# Patient Record
Sex: MALE | Race: BLACK OR AFRICAN AMERICAN | NOT HISPANIC OR LATINO | Employment: FULL TIME | URBAN - METROPOLITAN AREA
[De-identification: names, ages, dates, MRNs, and addresses within clinical notes are randomized per-mention and may not be internally consistent; named-entity substitution may affect disease eponyms.]

---

## 2023-08-10 ENCOUNTER — OFFICE VISIT (OUTPATIENT)
Dept: FAMILY MEDICINE | Facility: CLINIC | Age: 18
End: 2023-08-10

## 2023-08-10 VITALS
OXYGEN SATURATION: 100 % | WEIGHT: 173 LBS | DIASTOLIC BLOOD PRESSURE: 70 MMHG | SYSTOLIC BLOOD PRESSURE: 110 MMHG | TEMPERATURE: 100.8 F | HEART RATE: 94 BPM

## 2023-08-10 DIAGNOSIS — R50.9 FEVER, UNSPECIFIED FEVER CAUSE: ICD-10-CM

## 2023-08-10 DIAGNOSIS — J06.9 VIRAL URI: Primary | ICD-10-CM

## 2023-08-10 LAB — DEPRECATED S PYO AG THROAT QL EIA: NEGATIVE

## 2023-08-10 PROCEDURE — 87651 STREP A DNA AMP PROBE: CPT | Performed by: STUDENT IN AN ORGANIZED HEALTH CARE EDUCATION/TRAINING PROGRAM

## 2023-08-10 PROCEDURE — 99203 OFFICE O/P NEW LOW 30 MIN: CPT | Performed by: STUDENT IN AN ORGANIZED HEALTH CARE EDUCATION/TRAINING PROGRAM

## 2023-08-10 PROCEDURE — 87635 SARS-COV-2 COVID-19 AMP PRB: CPT | Performed by: STUDENT IN AN ORGANIZED HEALTH CARE EDUCATION/TRAINING PROGRAM

## 2023-08-10 RX ORDER — IBUPROFEN 200 MG
800 TABLET ORAL ONCE
Status: COMPLETED | OUTPATIENT
Start: 2023-08-10 | End: 2023-08-10

## 2023-08-10 RX ADMIN — Medication 800 MG: at 18:33

## 2023-08-10 NOTE — PROGRESS NOTES
ASSESSMENT & PLAN:   Diagnoses and all orders for this visit:  Viral URI  Fever, unspecified fever cause  -     Streptococcus A Rapid Screen w/Reflex to PCR - Clinic Collect  -     ibuprofen (ADVIL/MOTRIN) tablet 800 mg  -     Symptomatic COVID-19 Virus (Coronavirus) by PCR Nose  -     Group A Streptococcus PCR Throat Swab    URI symptoms x 1 day. Febrile at 101.3F upon presentation. After ibuprofen, fever, decreased to 100.8F and headache improved. Rapid strep negative, PCR pending. COVID test pending. Likely viral etiology. Discussed supportive treatment with OTC analgesics, Mucinex, increase fluids, rest. Follow-up if not improving as expected.    No follow-ups on file.    Patient Instructions   This is likely a viral illness. Viral illnesses will improve with time and symptoms may last 7 to 10 days.    Rapid strep test negative. Culture is pending. Covid test pending.  We will call you only with positive results.  For your sore throat, you may try salt water gargles, tea with honey, throat lozenges/spray, using a humidifier.  May try Mucinex to help with congestion/cough.  Take tylenol and/or ibuprofen as needed for pain/fever.  Stay well-hydrated, get plenty of rest, and wash your hands often.   Follow-up with your PCP in 7-10 days if symptoms persist, sooner if symptoms worsen.        Acetaminophen (tylenol)  325-650 mg every 4-6 hours as needed OR 1000 mg every 6 hours as needed. Maximum dose: 4000 mg/day  Ibuprofen (advil, motrin)  200-400 mg every 4-6 hours as needed -800 mg every 6-8 hours as needed. Maximum dose: 3200 mg/day     At the end of the encounter, I discussed results, diagnosis, medications. Discussed red flags for immediate return to clinic/ER, as well as indications for follow up if no improvement. Patient and/or caregiver understood and agreed to plan. Patient was stable for discharge.    ------------------------------------------------------------------------  SUBJECTIVE  Patient  presents with:  Urgent Care: Onset of headache, vertigo, fever/chills started yesterday. No known head injury. Pt has tried tylenol. Pt states no has no ST, ear pain.    HPI  Tommy Bhakta is a(n) 17 year old male presenting to urgent care with uncle for URI symptoms that began yesterday. Endorses sore throat, cough, headache, body aches, fever. Took Tylenol this morning which did not help. No congestion, rhinorrhea, nausea, vomiting, diarrhea, abdominal pain. No known sick contacts.    Review of Systems    No current outpatient medications on file.     Problem List:  There are no relevant problems documented for this patient.    No Known Allergies      OBJECTIVE  Vitals:    08/10/23 1720 08/10/23 1927   BP: 110/70    BP Location: Right arm    Patient Position: Sitting    Pulse: 94    Temp: (!) 101.3  F (38.5  C) (!) 100.8  F (38.2  C)   TempSrc: Oral    SpO2: 100%    Weight: 78.5 kg (173 lb)      Physical Exam   GENERAL: healthy, alert, no acute distress.   HEAD: normocephalic, atraumatic.  EYE: PERRL. EOMs intact. No scleral injection bilaterally.   EAR: external ear normal. Bilateral ear canals normal and nonpainful. Bilateral TM intact, pearly, translucent without bulging.  NOSE: external nose atraumatic without lesions.  OROPHARYNX: moist mucous membranes. Posterior oropharynx erythematous. No exudate. Uvula midline. Patent airway.  LUNGS: no increased work of breathing. Clear lung sounds bilaterally. No wheezing, rhonchi, or rales.   CV: regular rate and rhythm. No clicks, murmurs, or rubs.    Results for orders placed or performed in visit on 08/10/23   Streptococcus A Rapid Screen w/Reflex to PCR - Clinic Collect     Status: Normal    Specimen: Throat; Swab   Result Value Ref Range    Group A Strep antigen Negative Negative

## 2023-08-10 NOTE — PATIENT INSTRUCTIONS
This is likely a viral illness. Viral illnesses will improve with time and symptoms may last 7 to 10 days.    Rapid strep test negative. Culture is pending. Covid test pending.  We will call you only with positive results.  For your sore throat, you may try salt water gargles, tea with honey, throat lozenges/spray, using a humidifier.  May try Mucinex to help with congestion/cough.  Take tylenol and/or ibuprofen as needed for pain/fever.  Stay well-hydrated, get plenty of rest, and wash your hands often.   Follow-up with your PCP in 7-10 days if symptoms persist, sooner if symptoms worsen.        Acetaminophen (tylenol)  325-650 mg every 4-6 hours as needed OR 1000 mg every 6 hours as needed. Maximum dose: 4000 mg/day  Ibuprofen (advil, motrin)  200-400 mg every 4-6 hours as needed -800 mg every 6-8 hours as needed. Maximum dose: 3200 mg/day

## 2023-08-11 LAB
GROUP A STREP BY PCR: NOT DETECTED
SARS-COV-2 RNA RESP QL NAA+PROBE: NEGATIVE

## 2023-11-26 ENCOUNTER — APPOINTMENT (OUTPATIENT)
Dept: RADIOLOGY | Facility: HOSPITAL | Age: 18
End: 2023-11-26
Attending: STUDENT IN AN ORGANIZED HEALTH CARE EDUCATION/TRAINING PROGRAM
Payer: OTHER MISCELLANEOUS

## 2023-11-26 ENCOUNTER — HOSPITAL ENCOUNTER (EMERGENCY)
Facility: HOSPITAL | Age: 18
Discharge: HOME | End: 2023-11-27
Admitting: STUDENT IN AN ORGANIZED HEALTH CARE EDUCATION/TRAINING PROGRAM
Payer: OTHER MISCELLANEOUS

## 2023-11-26 VITALS
RESPIRATION RATE: 17 BRPM | WEIGHT: 175 LBS | HEIGHT: 70 IN | OXYGEN SATURATION: 98 % | HEART RATE: 74 BPM | DIASTOLIC BLOOD PRESSURE: 91 MMHG | TEMPERATURE: 97.3 F | BODY MASS INDEX: 25.05 KG/M2 | SYSTOLIC BLOOD PRESSURE: 135 MMHG

## 2023-11-26 DIAGNOSIS — M25.531 RIGHT WRIST PAIN: ICD-10-CM

## 2023-11-26 PROCEDURE — 73110 X-RAY EXAM OF WRIST: CPT | Mod: RT

## 2023-11-26 PROCEDURE — 99283 EMERGENCY DEPT VISIT LOW MDM: CPT

## 2023-11-26 PROCEDURE — 73130 X-RAY EXAM OF HAND: CPT | Mod: RT

## 2023-11-26 PROCEDURE — 250N000013 HC RX MED GY IP 250 OP 250 PS 637: Performed by: STUDENT IN AN ORGANIZED HEALTH CARE EDUCATION/TRAINING PROGRAM

## 2023-11-26 RX ORDER — IBUPROFEN 600 MG/1
600 TABLET, FILM COATED ORAL ONCE
Status: COMPLETED | OUTPATIENT
Start: 2023-11-26 | End: 2023-11-26

## 2023-11-26 RX ADMIN — IBUPROFEN 600 MG: 600 TABLET, FILM COATED ORAL at 20:32

## 2023-11-26 ASSESSMENT — ACTIVITIES OF DAILY LIVING (ADL)
ADLS_ACUITY_SCORE: 33
ADLS_ACUITY_SCORE: 33

## 2023-11-26 NOTE — Clinical Note
Tommy Bhakta was seen and treated in our emergency department on 11/26/2023.  He may return to work on 11/27/2023.  Please limit lifting over 15 lbs. Do not do motions that cause your wrist pain. Follow up with [facility] in 3 days if symptoms have not improved.     If you have any questions or concerns, please don't hesitate to call.      Mallory Powell PA-C

## 2023-11-27 NOTE — ED PROVIDER NOTES
Emergency Department Encounter   NAME: Tommy Bhakta ; AGE: 18 year old male ; YOB: 2005 ; MRN: [id number] ; PCP: No Ref-Primary, Physician   ED PROVIDER: Mallory Powell PA-C    Evaluation Date & Time:   No admission date for patient encounter.    CHIEF COMPLAINT:  Wrist Pain (Right )        Impression and Plan   FINAL IMPRESSION:    ICD-10-CM    1. Right wrist pain  M25.531 Wrist/Arm Supplies Order Wrist Brace; Right; with thumb spica          MDM:  Tommy Bhkata is a healthy 18-year-old male with no significant PMH presenting to the emergency department with his uncle for evaluation of right wrist pain that occurred while at work at [employer] earlier today. He states he was lifting some tires while at work and one caused his wrist to ulnarly deviate. He denies any numbness or tingling. He rates his current pain at 8/10, worse with bending the wrist. He denies any previous injury to the wrist or surgery.    Vitals reviewed and unremarkable. On exam he is resting comfortably. He is tender to palpation over the distal radius and scaphoid with very mild edema present. No overlying erythema or ecchymosis. Differential diagnosis includes but not limited to contusion, muscle strain, fracture. XR right wrist is normal, no evidence of fracture or malalignment. Right wrist and digit ROM full. Right sided  strength 4/5 in all directions due to pain when compared to the left. He is neurovascularly intact. He was given ibuprofen for pain and placed in a thumb spica wrist brace. I educated him on RICE and recommended ibuprofen for pain and swelling. He can wear the brace as needed to prevent further irritation. I also recommended follow up with [facility] if his wrist remains bothersome after the initial pain and swelling from the injury subsides given this was a work related injury. He and his uncle are understanding and agreeable to this plan.      Medical Decision  Making    History:  Supplemental history from: Documented in chart, if applicable  External Record(s) reviewed: Documented in chart, if applicable.    Work Up:  Chart documentation includes differential considered and any EKGs or imaging independently interpreted by provider, where specified.  In additional to work up documented, I considered the following work up: Documented in chart, if applicable.    External consultation:  Discussion of management with another provider: Documented in chart, if applicable    Complicating factors:  Care impacted by chronic illness: N/A  Care affected by social determinants of health: N/A    Disposition considerations: Discharge. No recommendations on prescription strength medication(s). See documentation for any additional details.      ED COURSE:  8:20 PM I met and introduced myself to the patient. I gathered initial history and performed my physical exam. We discussed results, discharge, follow up, and reasons to return to the ED.     At the conclusion of the encounter I discussed the results of all the tests and the disposition. The questions were answered. The patient or family acknowledged understanding and was agreeable with the care plan.        MEDICATIONS GIVEN IN THE EMERGENCY DEPARTMENT:  Medications   ibuprofen (ADVIL/MOTRIN) tablet 600 mg (600 mg Oral $Given 11/26/23 2032)         NEW PRESCRIPTIONS STARTED AT TODAY'S ED VISIT:  There are no discharge medications for this patient.        HPI   Patient information was obtained from: patient and patient's uncle  Use of Intrepreter: N/A     Tommy Bhakta is a healthy 18-year-old male with no significant PMH presenting to the emergency department with his uncle for evaluation of right wrist pain that occurred while at work at [employer] earlier today. He states he was lifting some tires while at work and one caused his wrist to ulnarly deviate. He denies any numbness or tingling. He rates his current pain at 8/10,  "worse with bending the wrist. He denies any previous injury to the wrist or surgery.      Medical History     No past medical history on file.    No past surgical history on file.    No family history on file.         No current outpatient medications on file.        Physical Exam     First Vitals:  Patient Vitals for the past 24 hrs:   BP Temp Temp src Pulse Resp SpO2 Height Weight   11/26/23 2035 (!) 135/91 -- -- 74 17 98 % -- --   11/26/23 1844 133/63 97.3  F (36.3  C) Oral 81 16 100 % 1.78 m (5' 10.08\") 79.4 kg (175 lb)         PHYSICAL EXAM    General Appearance:  Alert, cooperative, no distress, appears stated age  Musculoskeletal: Moving all extremities. No gross deformities. The right wrist is very mildly edematous. He is tender to palpation over the lateral aspect of the distal radius and scaphoid region. Wrist ROM full bilaterally. Right wrist flexion, extension, radial, and ulnar deviation strength 4/5 with pain elicited when compared bilaterally.   Integument: Warm, dry, no erythema or ecchymosis. Cap refill < 2 seconds in all digits. Radial pulse 2+ bilaterally.  Neurologic: No diminished sensation to light touch in the left upper extremity when compared bilaterally          Results     LAB:  All pertinent labs reviewed and interpreted  Labs Ordered and Resulted from Time of ED Arrival to Time of ED Departure - No data to display    RADIOLOGY:  XR Wrist Right G/E 3 Views   Final Result   IMPRESSION: Negative right hand and wrist radiographs. No fracture. Normal joint alignment and spacing. Soft tissues are unremarkable.      XR Hand Right G/E 3 Views   Final Result   IMPRESSION: Negative right hand and wrist radiographs. No fracture. Normal joint alignment and spacing. Soft tissues are unremarkable.              Mallory Powell PA-C   Emergency Medicine   [facility] EMERGENCY DEPARTMENT       Mallory Powell PA-C  11/27/23 1534    "

## 2023-11-27 NOTE — ED TRIAGE NOTES
Patient arrives for evaluation of right wrist pain, patient states that he was working earlier today at [employer] when he injured his right wrist while lifting a box. Currently reports pain to the right wrist, also reports pain to the thumb. CMS of right hand is in tact. Patient reports 8/10 pain when attempting to bend the wrist.

## 2023-11-27 NOTE — DISCHARGE INSTRUCTIONS
You were seen in the emergency department today for a right wrist injury while at work. Your xray was negative today for fracture or displacement. Your physical exam was reassuring that no major injury occurred. I recommend taking ibuprofen for pain and swelling as well as icing and elevating the hand to help with swelling.     You may take Ibuprofen up to 400 mg by mouth every 4-6 hours as needed for pain. Do not exceed 2400 mg/day.  You may take Tylenol 325-1000 mg by mouth every 4-6 hours as needed for pain. Do not exceed 1000mg (1g) in 4 hours or 4 g/day from all sources.  You may combine Tylenol and Ibuprofen- up to 400 mg of ibuprofen and 1000 mg of Tylenol at the same time, up to 3 times a day for the pain    You are given a wrist brace here in the emergency department, I recommend wearing this while at work and using her hands to help prevent further irritation. You can gradually wean out of the brace as your symptoms improve. If your pain and range of motion fails to improve after a few days, I recommend follow up with [facility] for further evaluation.